# Patient Record
Sex: MALE | Race: WHITE | NOT HISPANIC OR LATINO | Employment: FULL TIME | URBAN - METROPOLITAN AREA
[De-identification: names, ages, dates, MRNs, and addresses within clinical notes are randomized per-mention and may not be internally consistent; named-entity substitution may affect disease eponyms.]

---

## 2024-09-05 ENCOUNTER — OFFICE VISIT (OUTPATIENT)
Dept: FAMILY MEDICINE CLINIC | Facility: CLINIC | Age: 64
End: 2024-09-05
Payer: COMMERCIAL

## 2024-09-05 VITALS
HEART RATE: 74 BPM | RESPIRATION RATE: 20 BRPM | BODY MASS INDEX: 21.62 KG/M2 | DIASTOLIC BLOOD PRESSURE: 100 MMHG | OXYGEN SATURATION: 98 % | WEIGHT: 155 LBS | SYSTOLIC BLOOD PRESSURE: 160 MMHG

## 2024-09-05 DIAGNOSIS — S39.012D STRAIN OF LUMBAR REGION, SUBSEQUENT ENCOUNTER: Primary | ICD-10-CM

## 2024-09-05 DIAGNOSIS — F17.200 TOBACCO USE DISORDER: ICD-10-CM

## 2024-09-05 DIAGNOSIS — M54.50 ACUTE BILATERAL LOW BACK PAIN WITHOUT SCIATICA: ICD-10-CM

## 2024-09-05 PROCEDURE — 99213 OFFICE O/P EST LOW 20 MIN: CPT | Performed by: PHYSICIAN ASSISTANT

## 2024-09-05 NOTE — PROGRESS NOTES
"Chief Complaint  Chief Complaint   Patient presents with    Establish Care    Back Pain     Went to [facility] [place]       Subjective        Esteban Matos is a 64 y.o. male who presents to [facility].  History of Present Illness  Presents today for follow-up from the ER.  He states that on August 25 he carried an AC unit and after putting it down felt minor back pain.  The next day when he walked his dog he had significant back pain.  He reports he was unable to go to work.  He tried going back to work the next day but could not perform his work duties.  He then proceeded to the ER on Saturday 8/31.  The x-ray results showed multilevel lumbar degenerative disc disease and facet arthropathy. No acute osseous abnormality of the lumbar spine.  He was prescribed cyclobenzaprine, lidocaine patch, prednisone and discharged.   He has also been applying heat, which helps.  He still has dull back pain but overall improved. H he continues to have difficulty picking up objects due to the pain.  Denies numbness, tingling, saddle anesthesia, or urinary/fecal incontinence.     Objective   /100   Pulse 74   Resp 20   Wt 70.3 kg (155 lb)   SpO2 98%   BMI 21.62 kg/m²     Estimated body mass index is 21.62 kg/m² as calculated from the following:    Height as of 8/31/24: 180.3 cm (71\").    Weight as of this encounter: 70.3 kg (155 lb).     Physical Exam   GEN: In no acute distress, non toxic appearing  CV: Regular rate and rhythm, no murmurs, 2+ peripheral pulses, No extremity edema.   RESP: Lungs clear to auscultation anteriorly and posteriorly in all lung fields bilaterally.  MSK: No tenderness upon palpation of lower back.   PSYCH: Affect normal, insight fair     PHQ-2 Depression Screening  Little interest or pleasure in doing things? 0-->not at all   Feeling down, depressed, or hopeless? 0-->not at all   PHQ-2 Total Score 0      Result Review :              BP retaken and read at " 150/96    Assessment and Plan     Diagnoses and all orders for this visit:    1. Strain of lumbar region, subsequent encounter (Primary)  2. Acute bilateral low back pain without sciatica  Discussed for him to continue the cyclobenzaprine and prednisone.  He can take Tylenol as needed.  He can also continue heating pads or icing the area.  Instructed to avoid heavy lifting, bending, twisting.  Physical therapy referral was offered, however patient declined.  Discussed that should he develop numbness, tingling, urinary/fecal incontinence or saddle anesthesia, he will proceed to the ER which he understands.    3. Tobacco use disorder  Discussed with him to work on tobacco cessation.    His blood pressure was also elevated today and at his ER visit.  Offered to start him on a blood pressure medication, however patient declined.  Discussed for him to keep a blood pressure log as he does have a BP cuff at home, patient agrees to do so.  We will follow-up in a few weeks to review the blood pressure log as well as perform his annual physical.  He is in agreement this plan and call with any issues or concerns in the meantime.      Follow Up     Return in about 4 weeks (around 10/3/2024) for Annual physical.

## 2024-09-13 ENCOUNTER — TELEPHONE (OUTPATIENT)
Dept: FAMILY MEDICINE CLINIC | Facility: CLINIC | Age: 64
End: 2024-09-13
Payer: COMMERCIAL

## 2025-04-15 ENCOUNTER — OFFICE VISIT (OUTPATIENT)
Dept: FAMILY MEDICINE CLINIC | Facility: CLINIC | Age: 65
End: 2025-04-15
Payer: COMMERCIAL

## 2025-04-15 VITALS
HEART RATE: 59 BPM | OXYGEN SATURATION: 97 % | RESPIRATION RATE: 20 BRPM | SYSTOLIC BLOOD PRESSURE: 160 MMHG | HEIGHT: 71 IN | WEIGHT: 155 LBS | BODY MASS INDEX: 21.7 KG/M2 | DIASTOLIC BLOOD PRESSURE: 94 MMHG

## 2025-04-15 DIAGNOSIS — F17.200 TOBACCO USE DISORDER: ICD-10-CM

## 2025-04-15 DIAGNOSIS — I10 PRIMARY HYPERTENSION: Primary | ICD-10-CM

## 2025-04-15 DIAGNOSIS — I49.3 PREMATURE VENTRICULAR CONTRACTION ON ELECTROCARDIOGRAM: ICD-10-CM

## 2025-04-15 PROCEDURE — 93000 ELECTROCARDIOGRAM COMPLETE: CPT | Performed by: PHYSICIAN ASSISTANT

## 2025-04-15 PROCEDURE — 99213 OFFICE O/P EST LOW 20 MIN: CPT | Performed by: PHYSICIAN ASSISTANT

## 2025-04-15 RX ORDER — AMLODIPINE BESYLATE 5 MG/1
5 TABLET ORAL DAILY
Qty: 30 TABLET | Refills: 1 | Status: SHIPPED | OUTPATIENT
Start: 2025-04-15

## 2025-05-08 NOTE — PROGRESS NOTES
"Chief Complaint  Chief Complaint   Patient presents with    Hypertension       Subjective        Esteban Matos is a 64 y.o. male who presents to [facility].  History of Present Illness  Presents today for concerns of elevated blood pressure.    Has been checking his blood pressure at home, readings around 130s/80s- 150s/100s, rarely 120s/80s.   Reports occasionally he feels as if he can feel his heart beating in his chest and feels different for a couple hours, but then resolves spontaneously.  States this happens rarely and does not bother him.  Denies this occurring with exertion.   Denies headaches, vision changes, chest pain/pressure/tightness, SOA, diaphoresis, or abdominal pain.    Continues to smoke, has reduced amount.  Smokes about 5-6 cigarettes per day, was smoking 1ppd for about 50 years.     Objective   /94 (BP Location: Left arm)   Pulse 59   Resp 20   Ht 180.3 cm (70.98\")   Wt 70.3 kg (155 lb)   SpO2 97%   BMI 21.63 kg/m²     Estimated body mass index is 21.63 kg/m² as calculated from the following:    Height as of this encounter: 180.3 cm (70.98\").    Weight as of this encounter: 70.3 kg (155 lb).     Physical Exam   GEN: In no acute distress, non toxic appearing  CV: Regular rate and rhythm, no murmurs, 2+ peripheral pulses, No extremity edema.   RESP: Lungs clear to auscultation anteriorly and posteriorly in all lung fields bilaterally.  PSYCH: Affect normal, insight fair     PHQ-2 Depression Screening  Little interest or pleasure in doing things? Not at all   Feeling down, depressed, or hopeless? Not at all   PHQ-2 Total Score 0          ECG 12 Lead    Date/Time: 4/15/2025 11:01 AM  Performed by: Ofe Hanna PA-C    Authorized by: Ofe Hanna PA-C  Comparison: not compared with previous ECG   Previous ECG: no previous ECG available  Rhythm: sinus rhythm  Ectopy: unifocal PVCs  Rate: normal  Conduction: conduction normal  ST " Segments: ST segments normal  T Waves: T waves normal  QRS axis: normal  Other: no other findings    Clinical impression: normal ECG            Result Review :              Assessment and Plan     Assessment & Plan  Primary hypertension  Encouraged him to continue monitoring blood pressure daily and consistently, keep a log for my review.  Orders:    amLODIPine (NORVASC) 5 MG tablet; Take 1 tablet by mouth Daily.    Tobacco use disorder  Encouraged him to work on tobacco cessation.  Patient declined low-dose chest CT.       Premature ventricular contraction on electrocardiogram  Discussed PVCs are benign and can be monitored.  He states that they are not bothersome but feels reassured with findings.  Offered CMP to check magnesium and potassium and check thyroid function, patient declined today, states he will do at next appointment.       Follow-up in 1 month to recheck blood pressure.  He is in agreement with this plan and will call with any issues or concerns in the meantime.         Follow Up     Return in about 4 weeks (around 5/13/2025) for Recheck.     done

## 2025-05-22 ENCOUNTER — OFFICE VISIT (OUTPATIENT)
Dept: FAMILY MEDICINE CLINIC | Facility: CLINIC | Age: 65
End: 2025-05-22
Payer: COMMERCIAL

## 2025-05-22 VITALS
OXYGEN SATURATION: 99 % | HEIGHT: 71 IN | WEIGHT: 151 LBS | SYSTOLIC BLOOD PRESSURE: 134 MMHG | BODY MASS INDEX: 21.14 KG/M2 | RESPIRATION RATE: 20 BRPM | DIASTOLIC BLOOD PRESSURE: 80 MMHG | HEART RATE: 80 BPM

## 2025-05-22 DIAGNOSIS — Z53.20 COLON CANCER SCREENING DECLINED: ICD-10-CM

## 2025-05-22 DIAGNOSIS — Z00.00 ANNUAL PHYSICAL EXAM: ICD-10-CM

## 2025-05-22 DIAGNOSIS — M79.604 RIGHT LEG PAIN: ICD-10-CM

## 2025-05-22 DIAGNOSIS — Z12.5 SCREENING FOR PROSTATE CANCER: ICD-10-CM

## 2025-05-22 DIAGNOSIS — F17.200 TOBACCO USE DISORDER: ICD-10-CM

## 2025-05-22 DIAGNOSIS — I10 PRIMARY HYPERTENSION: Primary | ICD-10-CM

## 2025-05-22 RX ORDER — AMLODIPINE BESYLATE 10 MG/1
10 TABLET ORAL DAILY
Qty: 30 TABLET | Refills: 1 | Status: SHIPPED | OUTPATIENT
Start: 2025-05-22

## 2025-05-22 NOTE — PROGRESS NOTES
"Chief Complaint  Chief Complaint   Patient presents with    Hypertension     Follow up       Subjective        Esteban Matos is a 65 y.o. male who presents to [facility].  History of Present Illness  Presents today for follow-up on hypertension.    Taking amlodipine 5mg QD.  Checking blood pressure at home daily, readings around 120s-140s/80s-90s.     Currently smokes, trying to cut back.  Smokes about 5-8 cigarettes per day, has smoked for about 47 years.   Was smoking about 1ppd.     Due for colon cancer screening.     Concerns for right leg pain, went to ER on 5/5/2025.   Had improvement with Medrol Dosepak.  Would like to know what else he can do to relieve right leg pain and occasional low back pain.    Objective   /80 (BP Location: Left arm)   Pulse 80   Resp 20   Ht 180.3 cm (70.98\")   Wt 68.5 kg (151 lb)   SpO2 99%   BMI 21.07 kg/m²     Estimated body mass index is 21.07 kg/m² as calculated from the following:    Height as of this encounter: 180.3 cm (70.98\").    Weight as of this encounter: 68.5 kg (151 lb).     Physical Exam   GEN: In no acute distress, non toxic appearing  CV: Regular rate and rhythm, no murmurs, 2+ peripheral pulses, No extremity edema.   RESP: Lungs clear to auscultation anteriorly and posteriorly in all lung fields bilaterally.  PSYCH: Affect normal, insight fair         Result Review :              Assessment and Plan     Assessment & Plan  Primary hypertension  Blood pressure continues to be elevated.  Amlodipine increased to 10 mg daily.  Encouraged him to keep a blood pressure log for my review.  Orders:    amLODIPine (NORVASC) 10 MG tablet; Take 1 tablet by mouth Daily.    Tobacco use disorder  Declined CT for lung cancer screening.       Screening for prostate cancer    Orders:    PSA Screen; Future    Annual physical exam  Requested lab work be performed.  Orders:    CBC Auto Differential; Future    Comprehensive Metabolic " Panel; Future    Hemoglobin A1c; Future    Lipid Panel; Future    Urinalysis With Culture If Indicated -; Future    Colon cancer screening declined         Right leg pain  Offered referral to physical therapy, he declined at this time as he would prefer to continue stretches at home.  Encouraged applying heat, stretching, massaging, and taking Tylenol as needed.       He will follow-up in 1 month for annual physical as well as follow-up on hypertension.  He is in agreement with this plan and will call with any issues or concerns in the meantime.       Follow Up     Return in about 4 weeks (around 6/19/2025) for Annual physical.

## 2025-06-18 NOTE — PROGRESS NOTES
"Chief complaint: Patient presents today for a physical          Patient is a 65 y.o. male who presents for his yearly physical exam.     HPI   Medical conditions include:  Tobacco use disorder- 50yr pack hx  HTN-amlodipine 10 mg QD    Taking amlodipine daily.  Checks blood pressure daily, readings 110s-130s/80s.    - Exercise: none regularly   - ETOH: 2-3 shots rum per day  - Smoking: about 5-8 cigarettes per day, has smoked for about 47 years.   Was smoking about 1ppd.   - Diet: vegetables regularly, not many fruits, drinks 1 pepsi per day, some water, 2 cups coffe per day   -Sleep: no concerns   - Depression: no concerns     - Substance Use: marijuana occasionally     -No family history of colon or prostate cancer.                         /80 (BP Location: Left arm)   Pulse 66   Resp 20   Ht 180.3 cm (70.98\")   Wt 68.9 kg (152 lb)   SpO2 97%   BMI 21.21 kg/m²       GEN: In no acute distress, non toxic appearing  HEENT: Bilateral EACs clear, TMs of normal healthy appearance, middle ear spaces are clear. Mucous membranes moist. Oropharynx without erythema or exudate. No cervical or submandibular lymphadenopathy.  CV: Regular rate and rhythm, no murmurs, 2+ peripheral pulses, No extremity edema.   RESP: Lungs clear to auscultation anteriorly and posteriorly in all lung fields bilaterally.  MSK: No joint erythema, deformity, or effusion. Good ROM in upper and lower extremities.  NEURO: AAO to person, place, and time.   PSYCH: Affect normal, insight fair                  Assessment & Plan  Encounter for annual physical exam         Screening for prostate cancer    Orders:    PSA Screen    Primary hypertension  Hypertension is stable and controlled,   Continue current treatment regimen.  Blood pressure will be reassessed in 6 months.    Orders:    amLODIPine (NORVASC) 10 MG tablet; Take 1 tablet by mouth Daily.    Colon cancer screening declined         Lung cancer screening declined by patient     "     Tobacco use disorder  Encouraged tobacco cessation       He will follow-up in 6 months for recheck on blood pressure.  He is in agreement with this plan and will call with any issues or concerns in the meantime.    HM:  Colorectal Screening:  Start at 45 unless risk factors   PSA(Over age 50):  ordered today  US Aorta (For male smokers, age 65):  recommended, patient deferred  CT for Smoker (Age 50-80, 20pk yr):  recommended, patient deferred  Hep C: One time screening unless high risk     Screening Labs & Tests:  CBC, CMP, A1C, urinalysis, lipid panel- ordered at last appt  HEP C: ordered today  Other: PSA     Immunization/Vaccinations  Influenza: Recommended annual influenza vaccine  Tetanus/Pertussis: Reports received last year  Pneumovax: Recommended  Shingles: Recommended  COVID: Fully vaccinated      Lifestyle Counseling:  Lifestyle Modifications: Continue good lifestyle choices/modifications, Encouraged diet primarily of whole foods, decrease processed / packaged foods.  Reduce exposure to stress if possible.  Goal 150 minutes of moderate intensity exercise per week.  Decrease screen time.    Safety Issues: Always wear seatbelt, Avoid texting while driving.   Use sunscreen, regular skin examination  Recommended annual dental/vision examination.    Follow up: Return in about 6 months (around 12/19/2025) for Recheck.  Plan of care discussed with pt. They verbalized understanding and agreement.

## 2025-06-19 ENCOUNTER — LAB (OUTPATIENT)
Dept: FAMILY MEDICINE CLINIC | Facility: CLINIC | Age: 65
End: 2025-06-19
Payer: COMMERCIAL

## 2025-06-19 ENCOUNTER — OFFICE VISIT (OUTPATIENT)
Dept: FAMILY MEDICINE CLINIC | Facility: CLINIC | Age: 65
End: 2025-06-19
Payer: COMMERCIAL

## 2025-06-19 VITALS
HEIGHT: 71 IN | HEART RATE: 66 BPM | BODY MASS INDEX: 21.28 KG/M2 | DIASTOLIC BLOOD PRESSURE: 80 MMHG | RESPIRATION RATE: 20 BRPM | OXYGEN SATURATION: 97 % | SYSTOLIC BLOOD PRESSURE: 122 MMHG | WEIGHT: 152 LBS

## 2025-06-19 DIAGNOSIS — Z00.00 ENCOUNTER FOR ANNUAL PHYSICAL EXAM: ICD-10-CM

## 2025-06-19 DIAGNOSIS — Z12.5 SCREENING FOR PROSTATE CANCER: ICD-10-CM

## 2025-06-19 DIAGNOSIS — Z12.5 SCREENING FOR PROSTATE CANCER: Primary | ICD-10-CM

## 2025-06-19 DIAGNOSIS — F17.200 TOBACCO USE DISORDER: ICD-10-CM

## 2025-06-19 DIAGNOSIS — I10 PRIMARY HYPERTENSION: ICD-10-CM

## 2025-06-19 DIAGNOSIS — Z00.00 ANNUAL PHYSICAL EXAM: ICD-10-CM

## 2025-06-19 DIAGNOSIS — Z53.20 LUNG CANCER SCREENING DECLINED BY PATIENT: ICD-10-CM

## 2025-06-19 DIAGNOSIS — Z53.20 COLON CANCER SCREENING DECLINED: ICD-10-CM

## 2025-06-19 LAB
ALBUMIN SERPL-MCNC: 4.6 G/DL (ref 3.5–5.2)
ALBUMIN/GLOB SERPL: 1.5 G/DL
ALP SERPL-CCNC: 85 U/L (ref 39–117)
ALT SERPL W P-5'-P-CCNC: 15 U/L (ref 1–41)
ANION GAP SERPL CALCULATED.3IONS-SCNC: 11 MMOL/L (ref 5–15)
AST SERPL-CCNC: 20 U/L (ref 1–40)
BASOPHILS # BLD AUTO: 0.03 10*3/MM3 (ref 0–0.2)
BASOPHILS NFR BLD AUTO: 0.6 % (ref 0–1.5)
BILIRUB SERPL-MCNC: 0.8 MG/DL (ref 0–1.2)
BILIRUB UR QL STRIP: NEGATIVE
BUN SERPL-MCNC: 8 MG/DL (ref 8–23)
BUN/CREAT SERPL: 10.8 (ref 7–25)
CALCIUM SPEC-SCNC: 9.5 MG/DL (ref 8.6–10.5)
CHLORIDE SERPL-SCNC: 102 MMOL/L (ref 98–107)
CHOLEST SERPL-MCNC: 166 MG/DL (ref 0–200)
CLARITY UR: CLEAR
CO2 SERPL-SCNC: 27 MMOL/L (ref 22–29)
COLOR UR: YELLOW
CREAT SERPL-MCNC: 0.74 MG/DL (ref 0.76–1.27)
DEPRECATED RDW RBC AUTO: 51.3 FL (ref 37–54)
EGFRCR SERPLBLD CKD-EPI 2021: 99.3 ML/MIN/1.73
EOSINOPHIL # BLD AUTO: 0.08 10*3/MM3 (ref 0–0.4)
EOSINOPHIL NFR BLD AUTO: 1.7 % (ref 0.3–6.2)
ERYTHROCYTE [DISTWIDTH] IN BLOOD BY AUTOMATED COUNT: 13.4 % (ref 12.3–15.4)
GLOBULIN UR ELPH-MCNC: 3 GM/DL
GLUCOSE SERPL-MCNC: 87 MG/DL (ref 65–99)
GLUCOSE UR STRIP-MCNC: NEGATIVE MG/DL
HBA1C MFR BLD: 5 % (ref 4.8–5.6)
HCT VFR BLD AUTO: 54.4 % (ref 37.5–51)
HDLC SERPL-MCNC: 44 MG/DL (ref 40–60)
HGB BLD-MCNC: 18.8 G/DL (ref 13–17.7)
HGB UR QL STRIP.AUTO: NEGATIVE
HOLD SPECIMEN: NORMAL
IMM GRANULOCYTES # BLD AUTO: 0.01 10*3/MM3 (ref 0–0.05)
IMM GRANULOCYTES NFR BLD AUTO: 0.2 % (ref 0–0.5)
KETONES UR QL STRIP: NEGATIVE
LDLC SERPL CALC-MCNC: 84 MG/DL (ref 0–100)
LDLC/HDLC SERPL: 1.75 {RATIO}
LEUKOCYTE ESTERASE UR QL STRIP.AUTO: NEGATIVE
LYMPHOCYTES # BLD AUTO: 1.35 10*3/MM3 (ref 0.7–3.1)
LYMPHOCYTES NFR BLD AUTO: 29.2 % (ref 19.6–45.3)
MCH RBC QN AUTO: 35.5 PG (ref 26.6–33)
MCHC RBC AUTO-ENTMCNC: 34.6 G/DL (ref 31.5–35.7)
MCV RBC AUTO: 102.6 FL (ref 79–97)
MONOCYTES # BLD AUTO: 0.52 10*3/MM3 (ref 0.1–0.9)
MONOCYTES NFR BLD AUTO: 11.3 % (ref 5–12)
NEUTROPHILS NFR BLD AUTO: 2.63 10*3/MM3 (ref 1.7–7)
NEUTROPHILS NFR BLD AUTO: 57 % (ref 42.7–76)
NITRITE UR QL STRIP: NEGATIVE
NRBC BLD AUTO-RTO: 0 /100 WBC (ref 0–0.2)
PH UR STRIP.AUTO: 8 [PH] (ref 5–8)
PLATELET # BLD AUTO: 196 10*3/MM3 (ref 140–450)
PMV BLD AUTO: 10.9 FL (ref 6–12)
POTASSIUM SERPL-SCNC: 4.3 MMOL/L (ref 3.5–5.2)
PROT SERPL-MCNC: 7.6 G/DL (ref 6–8.5)
PROT UR QL STRIP: NEGATIVE
PSA SERPL-MCNC: 0.38 NG/ML (ref 0–4)
RBC # BLD AUTO: 5.3 10*6/MM3 (ref 4.14–5.8)
SODIUM SERPL-SCNC: 140 MMOL/L (ref 136–145)
SP GR UR STRIP: 1.01 (ref 1–1.03)
TRIGL SERPL-MCNC: 226 MG/DL (ref 0–150)
UROBILINOGEN UR QL STRIP: NORMAL
VLDLC SERPL-MCNC: 38 MG/DL (ref 5–40)
WBC NRBC COR # BLD AUTO: 4.62 10*3/MM3 (ref 3.4–10.8)

## 2025-06-19 PROCEDURE — 80061 LIPID PANEL: CPT | Performed by: PHYSICIAN ASSISTANT

## 2025-06-19 PROCEDURE — 83036 HEMOGLOBIN GLYCOSYLATED A1C: CPT | Performed by: PHYSICIAN ASSISTANT

## 2025-06-19 PROCEDURE — 36415 COLL VENOUS BLD VENIPUNCTURE: CPT

## 2025-06-19 PROCEDURE — G0103 PSA SCREENING: HCPCS | Performed by: PHYSICIAN ASSISTANT

## 2025-06-19 PROCEDURE — 85025 COMPLETE CBC W/AUTO DIFF WBC: CPT | Performed by: PHYSICIAN ASSISTANT

## 2025-06-19 PROCEDURE — 80053 COMPREHEN METABOLIC PANEL: CPT | Performed by: PHYSICIAN ASSISTANT

## 2025-06-19 PROCEDURE — 81003 URINALYSIS AUTO W/O SCOPE: CPT | Performed by: PHYSICIAN ASSISTANT

## 2025-06-19 RX ORDER — AMLODIPINE BESYLATE 10 MG/1
10 TABLET ORAL DAILY
Qty: 90 TABLET | Refills: 2 | Status: SHIPPED | OUTPATIENT
Start: 2025-06-19